# Patient Record
Sex: FEMALE | Race: BLACK OR AFRICAN AMERICAN | Employment: OTHER | URBAN - METROPOLITAN AREA
[De-identification: names, ages, dates, MRNs, and addresses within clinical notes are randomized per-mention and may not be internally consistent; named-entity substitution may affect disease eponyms.]

---

## 2019-01-29 LAB — LDL-C, EXTERNAL: 118

## 2019-02-11 ENCOUNTER — OFFICE VISIT (OUTPATIENT)
Dept: CARDIOLOGY CLINIC | Age: 79
End: 2019-02-11

## 2019-02-11 VITALS
HEART RATE: 64 BPM | SYSTOLIC BLOOD PRESSURE: 139 MMHG | HEIGHT: 65 IN | WEIGHT: 223 LBS | DIASTOLIC BLOOD PRESSURE: 68 MMHG | BODY MASS INDEX: 37.15 KG/M2

## 2019-02-11 DIAGNOSIS — I10 ESSENTIAL HYPERTENSION: ICD-10-CM

## 2019-02-11 DIAGNOSIS — R42 DIZZINESS: ICD-10-CM

## 2019-02-11 DIAGNOSIS — E78.5 HYPERLIPIDEMIA, UNSPECIFIED HYPERLIPIDEMIA TYPE: ICD-10-CM

## 2019-02-11 DIAGNOSIS — R06.02 SOB (SHORTNESS OF BREATH) ON EXERTION: Primary | ICD-10-CM

## 2019-02-11 DIAGNOSIS — R07.9 CHEST PAIN, UNSPECIFIED TYPE: ICD-10-CM

## 2019-02-11 RX ORDER — CYCLOSPORINE 0.5 MG/ML
1 EMULSION OPHTHALMIC EVERY 12 HOURS
COMMUNITY

## 2019-02-11 RX ORDER — ASPIRIN 81 MG/1
TABLET ORAL DAILY
COMMUNITY
End: 2019-03-19

## 2019-02-11 RX ORDER — SIMVASTATIN 40 MG/1
TABLET, FILM COATED ORAL
COMMUNITY

## 2019-02-11 RX ORDER — LOSARTAN POTASSIUM AND HYDROCHLOROTHIAZIDE 25; 100 MG/1; MG/1
1 TABLET ORAL DAILY
COMMUNITY

## 2019-02-11 NOTE — PATIENT INSTRUCTIONS
[employer] Activation Thank you for requesting access to [employer]. Please follow the instructions below to securely access and download your online medical record. [employer] allows you to send messages to your doctor, view your test results, renew your prescriptions, schedule appointments, and more. How Do I Sign Up? 1. In your internet browser, go to [email or web address]. 2. Click on the First Time User? Click Here link in the Sign In box. You will see the New Member Sign Up page. 3. Enter your [employer] Access Code exactly as it appears below. You will not need to use this code after youve completed the sign-up process. If you do not sign up before the expiration date, you must request a new code. [employer] Access Code: [email or web address] Expires: 3/28/2019 10:30 AM (This is the date your [employer] access code will ) 4. Enter the last four digits of your Social Security Number (xxxx) and Date of Birth (mm/dd/yyyy) as indicated and click Submit. You will be taken to the next sign-up page. 5. Create a [employer] ID. This will be your [employer] login ID and cannot be changed, so think of one that is secure and easy to remember. 6. Create a [employer] password. You can change your password at any time. 7. Enter your Password Reset Question and Answer. This can be used at a later time if you forget your password. 8. Enter your e-mail address. You will receive e-mail notification when new information is available in [address] 9. Click Sign Up. You can now view and download portions of your medical record. 10. Click the Download Summary menu link to download a portable copy of your medical information. Additional Information If you have questions, please visit the Frequently Asked Questions section of the [employer] website at [email or web address] Remember, [employer] is NOT to be used for urgent needs. For medical emergencies, dial 911.

## 2019-02-11 NOTE — PROGRESS NOTES
HISTORY OF PRESENT ILLNESS Elke Diaz is a 69 y.o. female. New Patient The history is provided by the patient. This is a new problem. Associated symptoms include chest pain and shortness of breath. Pertinent negatives include no abdominal pain and no headaches. Cholesterol Problem The history is provided by the patient. This is a chronic problem. The problem occurs constantly. The problem has not changed since onset. Associated symptoms include chest pain and shortness of breath. Pertinent negatives include no abdominal pain and no headaches. Hypertension The history is provided by the patient. This is a chronic problem. The problem occurs constantly. Associated symptoms include chest pain and shortness of breath. Pertinent negatives include no abdominal pain and no headaches. Nothing aggravates the symptoms. Chest Pain The history is provided by the patient. This is a new problem. The current episode started more than 1 week ago. The problem has not changed since onset. The problem occurs every several days. The pain is associated with rest. The pain is present in the lateral region. The pain is mild. The quality of the pain is described as dull. The pain does not radiate. Associated symptoms include dizziness and shortness of breath. Pertinent negatives include no abdominal pain, no claudication, no cough, no fever, no headaches, no hemoptysis, no nausea, no orthopnea, no palpitations, no PND, no sputum production, no vomiting and no weakness. Dizziness The history is provided by the patient. This is a recurrent problem. The problem has not changed since onset. Associated symptoms include chest pain and shortness of breath. Pertinent negatives include no abdominal pain and no headaches. The symptoms are aggravated by standing. Shortness of Breath The history is provided by the patient. This is a new problem. The problem occurs frequently. The problem has been gradually worsening.  Associated symptoms include chest pain. Pertinent negatives include no fever, no headaches, no cough, no sputum production, no hemoptysis, no wheezing, no PND, no orthopnea, no vomiting, no abdominal pain, no rash, no leg swelling and no claudication. Precipitated by: walking. Review of Systems Constitutional: Negative for chills and fever. HENT: Negative for nosebleeds. Eyes: Negative for blurred vision and double vision. Respiratory: Positive for shortness of breath. Negative for cough, hemoptysis, sputum production and wheezing. Cardiovascular: Positive for chest pain. Negative for palpitations, orthopnea, claudication, leg swelling and PND. Gastrointestinal: Negative for abdominal pain, heartburn, nausea and vomiting. Musculoskeletal: Negative for myalgias. Skin: Negative for rash. Neurological: Positive for dizziness. Negative for weakness and headaches. Endo/Heme/Allergies: Does not bruise/bleed easily. Family History Problem Relation Age of Onset  Hypertension Mother Past Medical History:  
Diagnosis Date  Essential hypertension  Hyperlipidemia Past Surgical History:  
Procedure Laterality Date  HX CATARACT REMOVAL Social History Tobacco Use  Smoking status: Never Smoker  Smokeless tobacco: Never Used Substance Use Topics  Alcohol use: No  
  Frequency: Never Allergies Allergen Reactions  Morphine Itching Prior to Admission medications Medication Sig Start Date End Date Taking? Authorizing Provider  
losartan-hydroCHLOROthiazide (HYZAAR) 100-25 mg per tablet Take 1 Tab by mouth daily. Yes Provider, Historical  
simvastatin (ZOCOR) 40 mg tablet Take  by mouth nightly. Yes Provider, Historical  
aspirin delayed-release 81 mg tablet Take  by mouth daily. Yes Provider, Historical  
cycloSPORINE (RESTASIS) 0.05 % dpet Administer 1 Drop to both eyes every twelve (12) hours.    Yes Provider, Historical  
 
 
 
 Visit Vitals /68 Pulse 64 Ht 5' 5\" (1.651 m) Wt 101.2 kg (223 lb) BMI 37.11 kg/m² Physical Exam  
Constitutional: She is oriented to person, place, and time. She appears well-developed and well-nourished. HENT:  
Head: Normocephalic and atraumatic. Eyes: Conjunctivae are normal.  
Neck: Neck supple. No JVD present. No tracheal deviation present. No thyromegaly present. Cardiovascular: Normal rate and regular rhythm. PMI is not displaced. Exam reveals no gallop, no S3 and no decreased pulses. No murmur heard. Pulmonary/Chest: No respiratory distress. She has no wheezes. She has no rales. She exhibits no tenderness. Abdominal: Soft. There is no tenderness. Musculoskeletal: She exhibits no edema. Neurological: She is alert and oriented to person, place, and time. Skin: Skin is warm. Psychiatric: She has a normal mood and affect. Ms. Maegan Jimenez has a reminder for a \"due or due soon\" health maintenance. I have asked that she contact her primary care provider for follow-up on this health maintenance. I have personally reviewed patients ekg done at other facility. I have personally reviewed patient's records available from hospital and other providers and incorporated findings in patient care. Assessment ICD-10-CM ICD-9-CM 1. SOB (shortness of breath) on exertion R06.02 786.05 ECHO ADULT COMPLETE  
   NUCLEAR CARDIAC STRESS TEST Recent increase shortness of breath on exertion likely related to diastolic heart failure and hypertensive heart disease. 2. Dizziness R42 780.4 Orthostatic blood pressure changes likely cause for dizziness monitor 3. Chest pain, unspecified type R07.9 786.50 ECHO ADULT COMPLETE  
   NUCLEAR CARDIAC STRESS TEST Atypical chest pain. Possible chest wall, GERD, angina 4. Essential hypertension I10 401.9 Hypertension hypertensive heart disease 5. Hyperlipidemia, unspecified hyperlipidemia type E78.5 272.4 On statin lab with PCP There are no discontinued medications. No orders of the defined types were placed in this encounter. Follow-up Disposition: 
Return for F/u after tests.

## 2019-02-11 NOTE — LETTER
Misael Washington 1940 2/11/2019 Dear Heber Gao MD 
 
I had the pleasure of evaluating  Ms. Hipolito Britt in office today. Below are the relevant portions of my assessment and plan of care. ICD-10-CM ICD-9-CM 1. SOB (shortness of breath) on exertion R06.02 786.05 ECHO ADULT COMPLETE  
   NUCLEAR CARDIAC STRESS TEST Recent increase shortness of breath on exertion likely related to diastolic heart failure and hypertensive heart disease. 2. Dizziness R42 780.4 Orthostatic blood pressure changes likely cause for dizziness monitor 3. Chest pain, unspecified type R07.9 786.50 ECHO ADULT COMPLETE  
   NUCLEAR CARDIAC STRESS TEST Atypical chest pain. Possible chest wall, GERD, angina 4. Essential hypertension I10 401.9 Hypertension hypertensive heart disease 5. Hyperlipidemia, unspecified hyperlipidemia type E78.5 272.4 On statin lab with PCP Current Outpatient Medications Medication Sig Dispense Refill  losartan-hydroCHLOROthiazide (HYZAAR) 100-25 mg per tablet Take 1 Tab by mouth daily.  simvastatin (ZOCOR) 40 mg tablet Take  by mouth nightly.  aspirin delayed-release 81 mg tablet Take  by mouth daily.  cycloSPORINE (RESTASIS) 0.05 % dpet Administer 1 Drop to both eyes every twelve (12) hours. Orders Placed This Encounter  losartan-hydroCHLOROthiazide (HYZAAR) 100-25 mg per tablet Sig: Take 1 Tab by mouth daily.  simvastatin (ZOCOR) 40 mg tablet Sig: Take  by mouth nightly.  aspirin delayed-release 81 mg tablet Sig: Take  by mouth daily.  cycloSPORINE (RESTASIS) 0.05 % dpet Sig: Administer 1 Drop to both eyes every twelve (12) hours. If you have questions, please do not hesitate to call me. I look forward to following Ms. Hipolito Britt along with you. Sincerely, Liliane Austin MD

## 2019-02-19 ENCOUNTER — CLINICAL SUPPORT (OUTPATIENT)
Dept: CARDIOLOGY CLINIC | Age: 79
End: 2019-02-19

## 2019-02-19 VITALS
DIASTOLIC BLOOD PRESSURE: 66 MMHG | BODY MASS INDEX: 38.82 KG/M2 | SYSTOLIC BLOOD PRESSURE: 160 MMHG | WEIGHT: 233 LBS | HEIGHT: 65 IN

## 2019-02-19 DIAGNOSIS — R06.02 SOB (SHORTNESS OF BREATH) ON EXERTION: ICD-10-CM

## 2019-02-19 DIAGNOSIS — R07.9 CHEST PAIN, UNSPECIFIED TYPE: ICD-10-CM

## 2019-02-19 LAB
ECHO AO ASC DIAM: 3.71 CM
ECHO AO ROOT DIAM: 3.6 CM
ECHO AV AREA PEAK VELOCITY: 2.7 CM2
ECHO AV AREA/BSA PEAK VELOCITY: 1.2 CM2/M2
ECHO AV PEAK GRADIENT: 5.5 MMHG
ECHO AV PEAK VELOCITY: 117.23 CM/S
ECHO AV REGURGITANT PHT: 780.7 CM
ECHO EST RA PRESSURE: 3 MMHG
ECHO LA AREA 2C: 32.15 CM2
ECHO LA AREA 4C: 24.3 CM2
ECHO LA MAJOR AXIS: 2.52 CM
ECHO LA VOL 2C: 98.37 ML (ref 22–52)
ECHO LA VOL 4C: 82.4 ML (ref 22–52)
ECHO LA VOL BP: 114.3 ML (ref 22–52)
ECHO LA VOL/BSA BIPLANE: 54.15 ML/M2 (ref 16–28)
ECHO LA VOLUME INDEX A2C: 46.6 ML/M2 (ref 16–28)
ECHO LA VOLUME INDEX A4C: 39.04 ML/M2 (ref 16–28)
ECHO LV E' LATERAL VELOCITY: 6.38 CM/S
ECHO LV E' SEPTAL VELOCITY: 4.52 CM/S
ECHO LV INTERNAL DIMENSION DIASTOLIC: 3.49 CM (ref 3.9–5.3)
ECHO LV INTERNAL DIMENSION SYSTOLIC: 2.37 CM
ECHO LV IVSD: 1.27 CM (ref 0.6–0.9)
ECHO LV MASS 2D: 173.2 G (ref 67–162)
ECHO LV MASS INDEX 2D: 82.1 G/M2 (ref 43–95)
ECHO LV POSTERIOR WALL DIASTOLIC: 1.3 CM (ref 0.6–0.9)
ECHO LVOT DIAM: 2.22 CM
ECHO LVOT PEAK GRADIENT: 2.6 MMHG
ECHO LVOT PEAK VELOCITY: 81.21 CM/S
ECHO MV "A" WAVE DURATION: 144.2 MSEC
ECHO MV A VELOCITY: 72.05 CM/S
ECHO MV AREA PHT: 2.6 CM2
ECHO MV E DECELERATION TIME (DT): 290.6 MS
ECHO MV E VELOCITY: 0.49 CM/S
ECHO MV E/A RATIO: 0.01
ECHO MV E/E' LATERAL: 0.08
ECHO MV E/E' RATIO (AVERAGED): 0.09
ECHO MV E/E' SEPTAL: 0.11
ECHO MV PRESSURE HALF TIME (PHT): 84.3 MS
ECHO PULMONARY ARTERY SYSTOLIC PRESSURE (PASP): 27 MMHG
ECHO PV MAX VELOCITY: 111.6 CM/S
ECHO PV PEAK GRADIENT: 5 MMHG
ECHO RA AREA 4C: 13.9 CM2
ECHO RV INTERNAL DIMENSION: 3.5 CM
ECHO RV TAPSE: 2.43 CM (ref 1.5–2)
ECHO RVOT DIAMETER: 2.02 CM
ECHO TV REGURGITANT MAX VELOCITY: 247.07 CM/S
ECHO TV REGURGITANT PEAK GRADIENT: 24.4 MMHG
PISA AR MAX VEL: 366.48 CM/S

## 2019-03-19 ENCOUNTER — OFFICE VISIT (OUTPATIENT)
Dept: CARDIOLOGY CLINIC | Age: 79
End: 2019-03-19

## 2019-03-19 VITALS
BODY MASS INDEX: 37.32 KG/M2 | DIASTOLIC BLOOD PRESSURE: 76 MMHG | SYSTOLIC BLOOD PRESSURE: 137 MMHG | WEIGHT: 224 LBS | HEART RATE: 64 BPM | HEIGHT: 65 IN

## 2019-03-19 DIAGNOSIS — I10 ESSENTIAL HYPERTENSION: ICD-10-CM

## 2019-03-19 DIAGNOSIS — E78.5 HYPERLIPIDEMIA, UNSPECIFIED HYPERLIPIDEMIA TYPE: ICD-10-CM

## 2019-03-19 DIAGNOSIS — R07.9 CHEST PAIN, UNSPECIFIED TYPE: Primary | ICD-10-CM

## 2019-03-19 DIAGNOSIS — I34.0 MILD MITRAL REGURGITATION: ICD-10-CM

## 2019-03-19 DIAGNOSIS — R42 DIZZINESS: ICD-10-CM

## 2019-03-19 NOTE — PROGRESS NOTES
HISTORY OF PRESENT ILLNESS  Cleatus Epley is a 69 y.o. female. Patient is here for follow up of diagnostic tests. Results will be discussed. Cholesterol Problem   The history is provided by the patient. This is a chronic problem. The problem occurs constantly. The problem has not changed since onset. Associated symptoms include chest pain and shortness of breath. Hypertension   The history is provided by the patient. This is a chronic problem. The problem occurs constantly. Associated symptoms include chest pain and shortness of breath. Nothing aggravates the symptoms. Chest Pain (Angina)    The history is provided by the patient. This is a new problem. The current episode started more than 1 week ago. The problem has been resolved. The problem occurs every several days. The pain is associated with rest. The pain is present in the lateral region. The pain is mild. The quality of the pain is described as dull. The pain does not radiate. Associated symptoms include shortness of breath. Pertinent negatives include no claudication, no cough, no dizziness, no fever, no hemoptysis, no nausea, no orthopnea, no palpitations, no PND, no sputum production, no vomiting and no weakness. Dizziness   The history is provided by the patient. This is a recurrent problem. The problem has been resolved. Associated symptoms include chest pain and shortness of breath. The symptoms are aggravated by standing. Shortness of Breath   The history is provided by the patient. This is a new problem. The problem occurs frequently. The problem has not changed since onset. Associated symptoms include chest pain. Pertinent negatives include no fever, no cough, no sputum production, no hemoptysis, no wheezing, no PND, no orthopnea, no vomiting, no rash, no leg swelling and no claudication. Precipitated by: walking. Review of Systems   Constitutional: Negative for chills and fever. HENT: Negative for nosebleeds.     Eyes: Negative for blurred vision and double vision. Respiratory: Positive for shortness of breath. Negative for cough, hemoptysis, sputum production and wheezing. Cardiovascular: Positive for chest pain. Negative for palpitations, orthopnea, claudication, leg swelling and PND. Gastrointestinal: Negative for heartburn, nausea and vomiting. Musculoskeletal: Negative for myalgias. Skin: Negative for rash. Neurological: Negative for dizziness and weakness. Endo/Heme/Allergies: Does not bruise/bleed easily. Family History   Problem Relation Age of Onset    Hypertension Mother        Past Medical History:   Diagnosis Date    Essential hypertension     Hyperlipidemia        Past Surgical History:   Procedure Laterality Date    HX CATARACT REMOVAL         Social History     Tobacco Use    Smoking status: Never Smoker    Smokeless tobacco: Never Used   Substance Use Topics    Alcohol use: No     Frequency: Never       Allergies   Allergen Reactions    Morphine Itching       Prior to Admission medications    Medication Sig Start Date End Date Taking? Authorizing Provider   losartan-hydroCHLOROthiazide (HYZAAR) 100-25 mg per tablet Take 1 Tab by mouth daily. Yes Provider, Historical   simvastatin (ZOCOR) 40 mg tablet Take  by mouth nightly. Yes Provider, Historical   cycloSPORINE (RESTASIS) 0.05 % dpet Administer 1 Drop to both eyes every twelve (12) hours. Yes Provider, Historical       Visit Vitals  /76   Pulse 64   Ht 5' 5\" (1.651 m)   Wt 101.6 kg (224 lb)   BMI 37.28 kg/m²         Physical Exam   Constitutional: She is oriented to person, place, and time. She appears well-developed and well-nourished. HENT:   Head: Normocephalic and atraumatic. Eyes: Conjunctivae are normal.   Neck: Neck supple. No JVD present. No tracheal deviation present. No thyromegaly present. Cardiovascular: Normal rate and regular rhythm. PMI is not displaced. Exam reveals no gallop, no S3 and no decreased pulses.    No murmur heard. Pulmonary/Chest: No respiratory distress. She has no wheezes. She has no rales. She exhibits no tenderness. Abdominal: Soft. There is no tenderness. Musculoskeletal: She exhibits no edema. Neurological: She is alert and oriented to person, place, and time. Skin: Skin is warm. Psychiatric: She has a normal mood and affect. Echo - 2/2019  · Estimated left ventricular ejection fraction is 56 - 60%. Left ventricular mild concentric hypertrophy. Normal left ventricular wall motion, no regional wall motion abnormality noted. Mild (grade 1) left ventricular diastolic dysfunction. · Left atrial cavity size is moderately dilated. · Normal right ventricular size and function. · Mild aortic valve regurgitation is present. · Mitral valve thickening. Mild mitral annular calcification. Mild mitral valve regurgitation. · Mild tricuspid valve regurgitation is present. There is no evidence of pulmonary hypertension. · Mild pulmonic valve regurgitation is present. NST - 3/14/2019  · Gated SPECT: Left ventricular function post-stress was normal. Calculated ejection fraction is 68%. There is no evidence of transient ischemic dilation (TID). The TID ratio is 1. 18.  · Myocardial perfusion imaging defect 1: Scanned left arm down  · Baseline ECG: Normal EKG. · Negative stress test.  · Left ventricular perfusion is normal.  · Negative myocardial perfusion imaging. Myocardial perfusion imaging supports a low risk stress test.       Ms. Nirmala Gómez has a reminder for a \"due or due soon\" health maintenance. I have asked that she contact her primary care provider for follow-up on this health maintenance. I have personally reviewed patients ekg done at other facility. I have personally reviewed patient's records available from hospital and other providers and incorporated findings in patient care. Assessment       ICD-10-CM ICD-9-CM    1.  Chest pain, unspecified type R07.9 786.50     NST normal - no evidence of ischemia or prior infarct   2. Dizziness R42 780.4    3. Essential hypertension I10 401.9     Controlled   4. Hyperlipidemia, unspecified hyperlipidemia type E78.5 272.4     Continue statin - lipids with PCP   5. Mild mitral regurgitation I34.0 424.0     Stable, monitor     3/2019 - C/O dyspnea on exertion. Denies further episodes of chest pain or dizziness. NST negative. Echo with EF 56-60% with mild MR and mild AI - will continue to monitor. B/P  Stable. No h/o CAD, may d/c aspirin. Medications Discontinued During This Encounter   Medication Reason    aspirin delayed-release 81 mg tablet Other       No orders of the defined types were placed in this encounter. Follow-up Disposition:  Return in about 6 months (around 9/19/2019), or if symptoms worsen or fail to improve. Patient is here for follow up of diagnostic tests. Results will be discussed.

## 2019-03-19 NOTE — PATIENT INSTRUCTIONS
Stop taking aspirin  Continue all other medications    F/U in 6 months or sooner if needed. Heart-Healthy Diet: Care Instructions  Your Care Instructions    A heart-healthy diet has lots of vegetables, fruits, nuts, beans, and whole grains, and is low in salt. It limits foods that are high in saturated fat, such as meats, cheeses, and fried foods. It may be hard to change your diet, but even small changes can lower your risk of heart attack and heart disease. Follow-up care is a key part of your treatment and safety. Be sure to make and go to all appointments, and call your doctor if you are having problems. It's also a good idea to know your test results and keep a list of the medicines you take. How can you care for yourself at home? Watch your portions  · Learn what a serving is. A \"serving\" and a \"portion\" are not always the same thing. Make sure that you are not eating larger portions than are recommended. For example, a serving of pasta is ½ cup. A serving size of meat is 2 to 3 ounces. A 3-ounce serving is about the size of a deck of cards. Measure serving sizes until you are good at [place]" them. Keep in mind that restaurants often serve portions that are 2 or 3 times the size of one serving. · To keep your energy level up and keep you from feeling hungry, eat often but in smaller portions. · Eat only the number of calories you need to stay at a healthy weight. If you need to lose weight, eat fewer calories than your body burns (through exercise and other physical activity). Eat more fruits and vegetables  · Eat a variety of fruit and vegetables every day. Dark green, deep orange, red, or yellow fruits and vegetables are especially good for you. Examples include spinach, carrots, peaches, and berries. · Keep carrots, celery, and other veggies handy for snacks. Buy fruit that is in season and store it where you can see it so that you will be tempted to eat it.   · Cook dishes that have a lot of veggies in them, such as stir-fries and soups. Limit saturated and trans fat  · Read food labels, and try to avoid saturated and trans fats. They increase your risk of heart disease. Trans fat is found in many processed foods such as cookies and crackers. · Use olive or canola oil when you cook. Try cholesterol-lowering spreads, such as Benecol or Take Control. · Bake, broil, grill, or steam foods instead of frying them. · Choose lean meats instead of high-fat meats such as hot dogs and sausages. Cut off all visible fat when you prepare meat. · Eat fish, skinless poultry, and meat alternatives such as soy products instead of high-fat meats. Soy products, such as tofu, may be especially good for your heart. · Choose low-fat or fat-free milk and dairy products. Eat fish  · Eat at least two servings of fish a week. Certain fish, such as salmon and tuna, contain omega-3 fatty acids, which may help reduce your risk of heart attack. Eat foods high in fiber  · Eat a variety of grain products every day. Include whole-grain foods that have lots of fiber and nutrients. Examples of whole-grain foods include oats, whole wheat bread, and brown rice. · Buy whole-grain breads and cereals, instead of white bread or pastries. Limit salt and sodium  · Limit how much salt and sodium you eat to help lower your blood pressure. · Taste food before you salt it. Add only a little salt when you think you need it. With time, your taste buds will adjust to less salt. · Eat fewer snack items, fast foods, and other high-salt, processed foods. Check food labels for the amount of sodium in packaged foods. · Choose low-sodium versions of canned goods (such as soups, vegetables, and beans). Limit sugar  · Limit drinks and foods with added sugar. These include candy, desserts, and soda pop. Limit alcohol  · Limit alcohol to no more than 2 drinks a day for men and 1 drink a day for women.  Too much alcohol can cause health problems. When should you call for help? Watch closely for changes in your health, and be sure to contact your doctor if:    · You would like help planning heart-healthy meals. Where can you learn more? Go to http://samantha-dang.info/. Enter V137 in the search box to learn more about \"Heart-Healthy Diet: Care Instructions. \"  Current as of: 2018  Content Version: 11.9  © 1538-7155 [employer]. Care instructions adapted under license by [employer] (which disclaims liability or warranty for this information). If you have questions about a medical condition or this instruction, always ask your healthcare professional. [address] any warranty or liability for your use of this information. MyChart Activation    Thank you for requesting access to [facility]. Please follow the instructions below to securely access and download your online medical record. [employer] allows you to send messages to your doctor, view your test results, renew your prescriptions, schedule appointments, and more. How Do I Sign Up? 1. In your internet browser, go to [email or web address]. 2. Click on the First Time User? Click Here link in the Sign In box. You will see the New Member Sign Up page. 3. Enter your [facility] Access Code exactly as it appears below. You will not need to use this code after youve completed the sign-up process. If you do not sign up before the expiration date, you must request a new code. [employer] Access Code: [email or web address]  Expires: 3/28/2019 11:30 AM (This is the date your [facility] access code will )    4. Enter the last four digits of your Social Security Number (xxxx) and Date of Birth (mm/dd/yyyy) as indicated and click Submit. You will be taken to the next sign-up page. 5. Create a [facility] ID.  This will be your [facility] login ID and cannot be changed, so think of one that is secure and easy to remember. 6. Create a [employer] password. You can change your password at any time. 7. Enter your Password Reset Question and Answer. This can be used at a later time if you forget your password. 8. Enter your e-mail address. You will receive e-mail notification when new information is available in [address] 9. Click Sign Up. You can now view and download portions of your medical record. 10. Click the Download Summary menu link to download a portable copy of your medical information. Additional Information    If you have questions, please visit the Frequently Asked Questions section of the [employer] website at [email or web address] Remember, [employer] is NOT to be used for urgent needs. For medical emergencies, dial 911.

## 2019-03-19 NOTE — PROGRESS NOTES
1. Have you been to the ER, urgent care clinic since your last visit? Hospitalized since your last visit?     no    2. Have you seen or consulted any other health care providers outside of the [address] [place] [address] since your last visit? Include any pap smears or colon screening. No     3. Since your last visit, have you had any of the following symptoms? shortness of breath.

## 2019-03-19 NOTE — LETTER
[facility] 1940 
 
3/19/2019 Dear Moe Cameron MD 
 
I had the pleasure of evaluating  Ms. Ivonne Llanos in office today. Below are the relevant portions of my assessment and plan of care. ICD-10-CM ICD-9-CM 1. Chest pain, unspecified type R07.9 786.50   
 NST normal - no evidence of ischemia or prior infarct 2. Dizziness R42 780.4 3. Essential hypertension I10 401.9 Controlled 4. Hyperlipidemia, unspecified hyperlipidemia type E78.5 272.4 Continue statin - lipids with PCP 5. Mild mitral regurgitation I34.0 424.0 Stable, monitor Current Outpatient Medications Medication Sig Dispense Refill  losartan-hydroCHLOROthiazide (HYZAAR) 100-25 mg per tablet Take 1 Tab by mouth daily.  simvastatin (ZOCOR) 40 mg tablet Take  by mouth nightly.  cycloSPORINE (RESTASIS) 0.05 % dpet Administer 1 Drop to both eyes every twelve (12) hours. No orders of the defined types were placed in this encounter. If you have questions, please do not hesitate to call me. I look forward to following Ms. Ivonne Llanos along with you. Sincerely, Honey Krause MD

## 2019-09-23 ENCOUNTER — OFFICE VISIT (OUTPATIENT)
Dept: CARDIOLOGY CLINIC | Age: 79
End: 2019-09-23

## 2019-09-23 VITALS
BODY MASS INDEX: 35.32 KG/M2 | SYSTOLIC BLOOD PRESSURE: 140 MMHG | HEIGHT: 65 IN | DIASTOLIC BLOOD PRESSURE: 71 MMHG | HEART RATE: 69 BPM | WEIGHT: 212 LBS

## 2019-09-23 DIAGNOSIS — I10 ESSENTIAL HYPERTENSION: Primary | ICD-10-CM

## 2019-09-23 DIAGNOSIS — E78.5 HYPERLIPIDEMIA, UNSPECIFIED HYPERLIPIDEMIA TYPE: ICD-10-CM

## 2019-09-23 DIAGNOSIS — I08.0 MITRAL AND AORTIC INSUFFICIENCY: ICD-10-CM

## 2019-09-23 NOTE — PROGRESS NOTES
HISTORY OF PRESENT ILLNESS  Michael Velázquez is a 69 y.o. female. Cholesterol Problem   The history is provided by the patient. This is a chronic problem. The problem occurs constantly. The problem has not changed since onset. Associated symptoms include chest pain and shortness of breath. Hypertension   The history is provided by the patient. This is a chronic problem. The problem occurs constantly. Associated symptoms include chest pain and shortness of breath. Nothing aggravates the symptoms. Chest Pain (Angina)    The history is provided by the patient. This is a new problem. The current episode started more than 1 week ago. The problem has been resolved. The problem occurs every several days. The pain is associated with rest. The pain is present in the lateral region. The pain is mild. The quality of the pain is described as dull. The pain does not radiate. Associated symptoms include shortness of breath. Pertinent negatives include no claudication, no cough, no dizziness, no fever, no hemoptysis, no nausea, no orthopnea, no palpitations, no PND, no sputum production, no vomiting and no weakness. Dizziness   The history is provided by the patient. This is a recurrent problem. The problem has been resolved. Associated symptoms include chest pain and shortness of breath. The symptoms are aggravated by standing. Shortness of Breath   The history is provided by the patient. This is a new problem. The problem occurs frequently. The problem has not changed since onset. Associated symptoms include chest pain. Pertinent negatives include no fever, no cough, no sputum production, no hemoptysis, no wheezing, no PND, no orthopnea, no vomiting, no rash, no leg swelling and no claudication. Precipitated by: walking. Review of Systems   Constitutional: Negative for chills and fever. HENT: Negative for nosebleeds. Eyes: Negative for blurred vision and double vision. Respiratory: Positive for shortness of breath. Negative for cough, hemoptysis, sputum production and wheezing. Cardiovascular: Positive for chest pain. Negative for palpitations, orthopnea, claudication, leg swelling and PND. Gastrointestinal: Negative for heartburn, nausea and vomiting. Musculoskeletal: Negative for myalgias. Skin: Negative for rash. Neurological: Negative for dizziness and weakness. Endo/Heme/Allergies: Does not bruise/bleed easily. Family History   Problem Relation Age of Onset    Hypertension Mother        Past Medical History:   Diagnosis Date    Essential hypertension     Hyperlipidemia        Past Surgical History:   Procedure Laterality Date    HX CATARACT REMOVAL         Social History     Tobacco Use    Smoking status: Never Smoker    Smokeless tobacco: Never Used   Substance Use Topics    Alcohol use: No     Frequency: Never       Allergies   Allergen Reactions    Morphine Itching       Prior to Admission medications    Medication Sig Start Date End Date Taking? Authorizing Provider   losartan-hydroCHLOROthiazide (HYZAAR) 100-25 mg per tablet Take 1 Tab by mouth daily. Yes Provider, Historical   simvastatin (ZOCOR) 40 mg tablet Take  by mouth nightly. Yes Provider, Historical   cycloSPORINE (RESTASIS) 0.05 % dpet Administer 1 Drop to both eyes every twelve (12) hours. Yes Provider, Historical       Visit Vitals  /71   Pulse 69   Ht 5' 5\" (1.651 m)   Wt 96.2 kg (212 lb)   BMI 35.28 kg/m²         Physical Exam   Constitutional: She is oriented to person, place, and time. She appears well-developed and well-nourished. HENT:   Head: Normocephalic and atraumatic. Eyes: Conjunctivae are normal.   Neck: Neck supple. No JVD present. No tracheal deviation present. No thyromegaly present. Cardiovascular: Normal rate and regular rhythm. PMI is not displaced. Exam reveals no gallop, no S3 and no decreased pulses. No murmur heard. Pulmonary/Chest: No respiratory distress. She has no wheezes.  She has no rales. She exhibits no tenderness. Abdominal: Soft. There is no tenderness. Musculoskeletal: She exhibits no edema. Neurological: She is alert and oriented to person, place, and time. Skin: Skin is warm. Psychiatric: She has a normal mood and affect. Echo - 2/2019  · Estimated left ventricular ejection fraction is 56 - 60%. Left ventricular mild concentric hypertrophy. Normal left ventricular wall motion, no regional wall motion abnormality noted. Mild (grade 1) left ventricular diastolic dysfunction. · Left atrial cavity size is moderately dilated. · Normal right ventricular size and function. · Mild aortic valve regurgitation is present. · Mitral valve thickening. Mild mitral annular calcification. Mild mitral valve regurgitation. · Mild tricuspid valve regurgitation is present. There is no evidence of pulmonary hypertension. · Mild pulmonic valve regurgitation is present. NST - 3/14/2019  · Gated SPECT: Left ventricular function post-stress was normal. Calculated ejection fraction is 68%. There is no evidence of transient ischemic dilation (TID). The TID ratio is 1. 18.  · Myocardial perfusion imaging defect 1: Scanned left arm down  · Baseline ECG: Normal EKG. · Negative stress test.  · Left ventricular perfusion is normal.  · Negative myocardial perfusion imaging. Myocardial perfusion imaging supports a low risk stress test.       Ms. Dannie Carey has a reminder for a \"due or due soon\" health maintenance. I have asked that she contact her primary care provider for follow-up on this health maintenance. I have personally reviewed patients ekg done at other facility. I have personally reviewed patient's records available from hospital and other providers and incorporated findings in patient care. Assessment       ICD-10-CM ICD-9-CM    1. Essential hypertension I10 401.9     Stable on treatment continue therapy   2. Mitral and aortic insufficiency I08.0 396.3     Mild.   Normal ejection fraction continue to monitor   3. Hyperlipidemia, unspecified hyperlipidemia type E78.5 272.4     Continue treatment lab with PCP     3/2019 - C/O dyspnea on exertion. Denies further episodes of chest pain or dizziness. NST negative. Echo with EF 56-60% with mild MR and mild AI - will continue to monitor. B/P  Stable. No h/o CAD, may d/c aspirin. 9/2019  Cardiac status stable. We will continue to monitor. There are no discontinued medications. No orders of the defined types were placed in this encounter. Follow-up and Dispositions    · Return in about 1 year (around 9/23/2020).

## 2020-06-06 ENCOUNTER — IP HISTORICAL/CONVERTED ENCOUNTER (OUTPATIENT)
Dept: OTHER | Age: 80
End: 2020-06-06

## 2020-07-08 ENCOUNTER — OP HISTORICAL/CONVERTED ENCOUNTER (OUTPATIENT)
Dept: OTHER | Age: 80
End: 2020-07-08

## 2020-07-13 ENCOUNTER — OP HISTORICAL/CONVERTED ENCOUNTER (OUTPATIENT)
Dept: OTHER | Age: 80
End: 2020-07-13

## 2020-08-11 ENCOUNTER — OP HISTORICAL/CONVERTED ENCOUNTER (OUTPATIENT)
Dept: OTHER | Age: 80
End: 2020-08-11

## 2021-02-10 ENCOUNTER — TRANSCRIBE ORDER (OUTPATIENT)
Dept: SCHEDULING | Age: 81
End: 2021-02-10

## 2021-02-10 DIAGNOSIS — R92.8 ABNORMAL MAMMOGRAM: Primary | ICD-10-CM

## 2021-02-17 ENCOUNTER — TRANSCRIBE ORDER (OUTPATIENT)
Dept: SCHEDULING | Age: 81
End: 2021-02-17

## 2021-02-17 DIAGNOSIS — R92.8 ABNORMAL MAMMOGRAM OF LEFT BREAST: ICD-10-CM

## 2021-02-17 DIAGNOSIS — R92.8 ABNORMAL MAMMOGRAM OF RIGHT BREAST: Primary | ICD-10-CM

## 2021-02-26 ENCOUNTER — HOSPITAL ENCOUNTER (OUTPATIENT)
Dept: MAMMOGRAPHY | Age: 81
Discharge: HOME | End: 2021-02-26

## 2021-02-26 DIAGNOSIS — R92.8 ABNORMAL MAMMOGRAM OF RIGHT BREAST: ICD-10-CM

## 2021-02-26 DIAGNOSIS — R92.8 ABNORMAL MAMMOGRAM: ICD-10-CM

## 2021-08-31 ENCOUNTER — TRANSCRIBE ORDER (OUTPATIENT)
Dept: SCHEDULING | Age: 81
End: 2021-08-31

## 2021-08-31 DIAGNOSIS — Z12.31 VISIT FOR SCREENING MAMMOGRAM: Primary | ICD-10-CM

## 2021-10-12 ENCOUNTER — HOSPITAL ENCOUNTER (OUTPATIENT)
Dept: MAMMOGRAPHY | Age: 81
Discharge: HOME | End: 2021-10-12
Payer: MEDICARE

## 2021-10-12 DIAGNOSIS — Z12.31 VISIT FOR SCREENING MAMMOGRAM: ICD-10-CM

## 2021-10-12 PROCEDURE — 77063 BREAST TOMOSYNTHESIS BI: CPT

## 2023-05-26 RX ORDER — LOSARTAN POTASSIUM AND HYDROCHLOROTHIAZIDE 25; 100 MG/1; MG/1
1 TABLET ORAL DAILY
COMMUNITY

## 2023-05-26 RX ORDER — CYCLOSPORINE 0.5 MG/ML
1 EMULSION OPHTHALMIC EVERY 12 HOURS
COMMUNITY

## 2023-05-26 RX ORDER — SIMVASTATIN 40 MG
TABLET ORAL
COMMUNITY